# Patient Record
Sex: FEMALE | Race: WHITE | NOT HISPANIC OR LATINO | URBAN - METROPOLITAN AREA
[De-identification: names, ages, dates, MRNs, and addresses within clinical notes are randomized per-mention and may not be internally consistent; named-entity substitution may affect disease eponyms.]

---

## 2017-11-28 ENCOUNTER — EMERGENCY (EMERGENCY)
Facility: HOSPITAL | Age: 22
LOS: 1 days | Discharge: ANOTHER HOSPITAL | End: 2017-11-28
Attending: EMERGENCY MEDICINE
Payer: SELF-PAY

## 2017-11-28 VITALS
HEIGHT: 68.11 IN | SYSTOLIC BLOOD PRESSURE: 123 MMHG | TEMPERATURE: 98 F | DIASTOLIC BLOOD PRESSURE: 74 MMHG | HEART RATE: 78 BPM | RESPIRATION RATE: 16 BRPM | OXYGEN SATURATION: 99 % | WEIGHT: 143.3 LBS

## 2017-11-28 PROCEDURE — 99285 EMERGENCY DEPT VISIT HI MDM: CPT

## 2017-11-28 RX ORDER — ACETAMINOPHEN 500 MG
1000 TABLET ORAL ONCE
Qty: 0 | Refills: 0 | Status: DISCONTINUED | OUTPATIENT
Start: 2017-11-28 | End: 2017-12-02

## 2017-11-28 RX ORDER — SODIUM CHLORIDE 9 MG/ML
1000 INJECTION INTRAMUSCULAR; INTRAVENOUS; SUBCUTANEOUS ONCE
Qty: 0 | Refills: 0 | Status: COMPLETED | OUTPATIENT
Start: 2017-11-28 | End: 2017-11-28

## 2017-11-28 RX ORDER — KETOROLAC TROMETHAMINE 30 MG/ML
30 SYRINGE (ML) INJECTION ONCE
Qty: 0 | Refills: 0 | Status: DISCONTINUED | OUTPATIENT
Start: 2017-11-28 | End: 2017-11-28

## 2017-11-28 RX ORDER — DEXAMETHASONE 0.5 MG/5ML
10 ELIXIR ORAL ONCE
Qty: 0 | Refills: 0 | Status: COMPLETED | OUTPATIENT
Start: 2017-11-28 | End: 2017-11-28

## 2017-11-28 NOTE — ED PROVIDER NOTE - PHYSICAL EXAMINATION
HENMT:  marked swelling and erythema to the R tonsil and sub palette  hot potato voice  no trismus  no drooling  respiration is even and unlabored   no respiratory distress  peritonsillar abscess.

## 2017-11-28 NOTE — ED PROVIDER NOTE - OBJECTIVE STATEMENT
23 y/o F w/ no significant PMHx presents to the ED c/o tonsillar pain since 3 days. Pt's  is at the bedside and acted as the ; stated that pt has been suffering from associated fever, decreased appetite, drinking, and nasal congestion. Pt denies any other complaints. NKDA.

## 2017-11-28 NOTE — ED PROVIDER NOTE - MEDICAL DECISION MAKING DETAILS
R PTA, currently stable, managing secretions, resp even/unlabored, comfortable appearing, d/w Dr. Millard @ [facility], pt to be transferred there now, will start steroid/abx prior to transfer

## 2017-11-29 ENCOUNTER — EMERGENCY (EMERGENCY)
Facility: HOSPITAL | Age: 22
LOS: 1 days | Discharge: HOME | End: 2017-11-29
Attending: EMERGENCY MEDICINE | Admitting: EMERGENCY MEDICINE
Payer: SELF-PAY

## 2017-11-29 VITALS
SYSTOLIC BLOOD PRESSURE: 120 MMHG | HEART RATE: 86 BPM | RESPIRATION RATE: 16 BRPM | TEMPERATURE: 98 F | DIASTOLIC BLOOD PRESSURE: 74 MMHG | OXYGEN SATURATION: 100 %

## 2017-11-29 VITALS
HEART RATE: 101 BPM | DIASTOLIC BLOOD PRESSURE: 73 MMHG | OXYGEN SATURATION: 98 % | RESPIRATION RATE: 18 BRPM | SYSTOLIC BLOOD PRESSURE: 122 MMHG | TEMPERATURE: 100 F

## 2017-11-29 LAB
ALBUMIN SERPL ELPH-MCNC: 3.6 G/DL — SIGNIFICANT CHANGE UP (ref 3.5–5)
ALP SERPL-CCNC: 46 U/L — SIGNIFICANT CHANGE UP (ref 40–120)
ALT FLD-CCNC: 14 U/L DA — SIGNIFICANT CHANGE UP (ref 10–60)
ANION GAP SERPL CALC-SCNC: 7 MMOL/L — SIGNIFICANT CHANGE UP (ref 5–17)
APTT BLD: 22.4 SEC — LOW (ref 27.5–37.4)
AST SERPL-CCNC: 8 U/L — LOW (ref 10–40)
BILIRUB SERPL-MCNC: 0.3 MG/DL — SIGNIFICANT CHANGE UP (ref 0.2–1.2)
BUN SERPL-MCNC: 9 MG/DL — SIGNIFICANT CHANGE UP (ref 7–18)
CALCIUM SERPL-MCNC: 8.9 MG/DL — SIGNIFICANT CHANGE UP (ref 8.4–10.5)
CHLORIDE SERPL-SCNC: 105 MMOL/L — SIGNIFICANT CHANGE UP (ref 96–108)
CO2 SERPL-SCNC: 26 MMOL/L — SIGNIFICANT CHANGE UP (ref 22–31)
CREAT SERPL-MCNC: 0.88 MG/DL — SIGNIFICANT CHANGE UP (ref 0.5–1.3)
GLUCOSE SERPL-MCNC: 83 MG/DL — SIGNIFICANT CHANGE UP (ref 70–99)
HCG UR QL: NEGATIVE — SIGNIFICANT CHANGE UP
HCT VFR BLD CALC: 42.1 % — SIGNIFICANT CHANGE UP (ref 34.5–45)
HGB BLD-MCNC: 13.4 G/DL — SIGNIFICANT CHANGE UP (ref 11.5–15.5)
INR BLD: 1.12 RATIO — SIGNIFICANT CHANGE UP (ref 0.88–1.16)
MCHC RBC-ENTMCNC: 29.5 PG — SIGNIFICANT CHANGE UP (ref 27–34)
MCHC RBC-ENTMCNC: 31.9 GM/DL — LOW (ref 32–36)
MCV RBC AUTO: 92.4 FL — SIGNIFICANT CHANGE UP (ref 80–100)
PLATELET # BLD AUTO: 304 K/UL — SIGNIFICANT CHANGE UP (ref 150–400)
POTASSIUM SERPL-MCNC: 4.2 MMOL/L — SIGNIFICANT CHANGE UP (ref 3.5–5.3)
POTASSIUM SERPL-SCNC: 4.2 MMOL/L — SIGNIFICANT CHANGE UP (ref 3.5–5.3)
PROT SERPL-MCNC: 8.3 G/DL — SIGNIFICANT CHANGE UP (ref 6–8.3)
PROTHROM AB SERPL-ACNC: 12.2 SEC — SIGNIFICANT CHANGE UP (ref 9.8–12.7)
RBC # BLD: 4.55 M/UL — SIGNIFICANT CHANGE UP (ref 3.8–5.2)
RBC # FLD: 11.8 % — SIGNIFICANT CHANGE UP (ref 10.3–14.5)
SODIUM SERPL-SCNC: 138 MMOL/L — SIGNIFICANT CHANGE UP (ref 135–145)
WBC # BLD: 17.6 K/UL — HIGH (ref 3.8–10.5)
WBC # FLD AUTO: 17.6 K/UL — HIGH (ref 3.8–10.5)

## 2017-11-29 PROCEDURE — 99284 EMERGENCY DEPT VISIT MOD MDM: CPT | Mod: 25

## 2017-11-29 PROCEDURE — 87081 CULTURE SCREEN ONLY: CPT

## 2017-11-29 PROCEDURE — 87040 BLOOD CULTURE FOR BACTERIA: CPT

## 2017-11-29 PROCEDURE — 85610 PROTHROMBIN TIME: CPT

## 2017-11-29 PROCEDURE — 85730 THROMBOPLASTIN TIME PARTIAL: CPT

## 2017-11-29 PROCEDURE — 99285 EMERGENCY DEPT VISIT HI MDM: CPT | Mod: 25

## 2017-11-29 PROCEDURE — 96375 TX/PRO/DX INJ NEW DRUG ADDON: CPT

## 2017-11-29 PROCEDURE — 99053 MED SERV 10PM-8AM 24 HR FAC: CPT

## 2017-11-29 PROCEDURE — 96374 THER/PROPH/DIAG INJ IV PUSH: CPT

## 2017-11-29 PROCEDURE — 80053 COMPREHEN METABOLIC PANEL: CPT

## 2017-11-29 PROCEDURE — 81025 URINE PREGNANCY TEST: CPT

## 2017-11-29 PROCEDURE — 85027 COMPLETE CBC AUTOMATED: CPT

## 2017-11-29 RX ADMIN — Medication 102 MILLIGRAM(S): at 00:26

## 2017-11-29 RX ADMIN — SODIUM CHLORIDE 1000 MILLILITER(S): 9 INJECTION INTRAMUSCULAR; INTRAVENOUS; SUBCUTANEOUS at 00:26

## 2017-11-29 RX ADMIN — Medication 100 MILLIGRAM(S): at 00:28

## 2017-11-29 NOTE — CONSULT NOTE ADULT - SUBJECTIVE AND OBJECTIVE BOX
healthy 22 year old present with sore throat for 1 week acutely worsening over past 24hrs.  She reports trismus, odynophagia and R neck pain.  No SOB, or difficulty breathing.  History of many tonsil infection, no PTAs.    PMH; none  PSH; none  allergies: NKDA    vss  awake, alert, interactive  breathing comforably  no stridor  voice strong  face symmetric  NC normal  OC/OP high arched narrow palate  fulness, erythema and fluctuance of R soft palate  neck soft, tender in R level II      1cc of 1% lido with epi injected at R soft palate.  18G needle used to aspirate 7cc pus which was sent for culture.  1cm incision was made with egress of more pus.  The cavity was copiously irrigated with normal saline    A/P s/p I&D R PTA  - clindamycin x 10 days  - diet as tolerated  - counselled that PTA can return and to return to ED if symptoms recur  - f/u PCP  - [place] as needed [phone]

## 2017-11-29 NOTE — ED ADULT NURSE NOTE - CHIEF COMPLAINT QUOTE
c/o sore throat x 3 days w/ fever, talking, breathing normally and has pain when swallowing solids, no drooling noted

## 2017-11-29 NOTE — ED PROVIDER NOTE - PROGRESS NOTE DETAILS
[place]: s/p drainage by ENT, comfortable for dc, outpt f/u, clinda. No resp distress, well appearing.

## 2017-11-29 NOTE — ED PROVIDER NOTE - OBJECTIVE STATEMENT
23yo f transferred from [facility] for PTA. Fever yesterday. Pain x 2 days. Able to tolerate PO. No SOB. Mild voice changes. 21yo f transferred from [facility] for PTA. Fever yesterday. Pain x 2 days. Able to tolerate PO. No SOB. Mild voice changes.  [place]: 22F no PMH transferred from [facility] concern for PTA. Pt w/ sore throat x2-3d, fevers, nasal congestion. +voice change. Denies SOB/CP, NVD, abd pain, urinary complaints, recent travel. Received clinda, decadron, toradol.

## 2017-11-29 NOTE — ED PROVIDER NOTE - ATTENDING CONTRIBUTION TO CARE
22F no PMH transferred from [facility] concern for PTA (sore throat, fevers). Received clinda/decadron. Slight tachycardia and low grade fever, other vitals wnl. Exam as above. No resp distress or concern for airway compromise.   ddx: Likely PTA.  ENT consult, reassess.

## 2017-11-29 NOTE — ED ADULT TRIAGE NOTE - CHIEF COMPLAINT QUOTE
Arrived by EMS.  Transferred from [facility] for r/o peritonsillar abscess.  Appears comfortable and in no apparent distress.  Complaining of difficulty swallowing and pain with swallowing.  Symptoms started 2 days ago and progressively worsen.  Denies SOB, chest pain, nausea or vomiting.  Given Clindamycin, Toradol and Dexamethasone at [place].  No past medical history.

## 2017-11-29 NOTE — ED ADULT NURSE NOTE - OBJECTIVE STATEMENT
came to Ed due to sorethroat with pain on swallowing, pt speaks [employer] with relative at bedside interpreted for patient.

## 2017-11-30 LAB — SPECIMEN SOURCE: SIGNIFICANT CHANGE UP

## 2017-12-01 LAB
CULTURE RESULTS: SIGNIFICANT CHANGE UP
SPECIMEN SOURCE: SIGNIFICANT CHANGE UP

## 2017-12-04 LAB
BACTERIA WND CULT: SIGNIFICANT CHANGE UP
CULTURE RESULTS: SIGNIFICANT CHANGE UP
CULTURE RESULTS: SIGNIFICANT CHANGE UP
SPECIMEN SOURCE: SIGNIFICANT CHANGE UP
SPECIMEN SOURCE: SIGNIFICANT CHANGE UP

## 2020-03-10 NOTE — ED ADULT TRIAGE NOTE - MODE OF ARRIVAL
Walk in [facility] Counseling: I discussed with the patient the risks of valacyclovir including but not limited to kidney damage, nausea, vomiting and severe allergy.  The patient understands that if the infection seems to be worsening or is not improving, they are to call.

## 2023-09-13 NOTE — ED ADULT TRIAGE NOTE - MODE OF ARRIVAL
Pt here for Aranesp injection. CBC reviewed from 9/13 and tx released.  Injection given SQ to right upper arm pt tolerated without incident left ambulatory discharge instructions given EMS

## 2024-07-16 NOTE — ED ADULT TRIAGE NOTE - CHIEF COMPLAINT QUOTE
c/o sore throat x 3 days w/ fever, talking, breathing normally and has pain when swallowing solids, no drooling noted
Detail Level: Detailed

## 2024-08-20 NOTE — ED PROVIDER NOTE - RECENT EXPOSURE TO
HR=76 bpm, WZJY=050/61 mmhg, SpO2=97.0 %, Resp=22 B/min, EtCO2=35 mmHg, Pain=0, Les=10, [place]=2 none known